# Patient Record
Sex: MALE | Race: WHITE | URBAN - METROPOLITAN AREA
[De-identification: names, ages, dates, MRNs, and addresses within clinical notes are randomized per-mention and may not be internally consistent; named-entity substitution may affect disease eponyms.]

---

## 2017-02-09 ENCOUNTER — OFFICE VISIT (OUTPATIENT)
Dept: FAMILY MEDICINE | Facility: CLINIC | Age: 27
End: 2017-02-09
Payer: COMMERCIAL

## 2017-02-09 VITALS
HEART RATE: 90 BPM | SYSTOLIC BLOOD PRESSURE: 124 MMHG | BODY MASS INDEX: 20.39 KG/M2 | OXYGEN SATURATION: 98 % | DIASTOLIC BLOOD PRESSURE: 76 MMHG | HEIGHT: 65 IN | TEMPERATURE: 97.9 F | WEIGHT: 122.4 LBS

## 2017-02-09 DIAGNOSIS — L02.91 ABSCESS: Primary | ICD-10-CM

## 2017-02-09 LAB
GRAM STN SPEC: NORMAL
MICRO REPORT STATUS: NORMAL
SPECIMEN SOURCE: NORMAL

## 2017-02-09 PROCEDURE — 87186 SC STD MICRODIL/AGAR DIL: CPT | Performed by: NURSE PRACTITIONER

## 2017-02-09 PROCEDURE — 87205 SMEAR GRAM STAIN: CPT | Performed by: NURSE PRACTITIONER

## 2017-02-09 PROCEDURE — 87077 CULTURE AEROBIC IDENTIFY: CPT | Performed by: NURSE PRACTITIONER

## 2017-02-09 PROCEDURE — 87070 CULTURE OTHR SPECIMN AEROBIC: CPT | Performed by: NURSE PRACTITIONER

## 2017-02-09 PROCEDURE — 99213 OFFICE O/P EST LOW 20 MIN: CPT | Performed by: NURSE PRACTITIONER

## 2017-02-09 RX ORDER — CEPHALEXIN 500 MG/1
500 CAPSULE ORAL 3 TIMES DAILY
Qty: 30 CAPSULE | Refills: 0 | Status: SHIPPED | OUTPATIENT
Start: 2017-02-09 | End: 2017-02-19

## 2017-02-09 NOTE — NURSING NOTE
"Chief Complaint   Patient presents with     Mass     Abdominal Pain       Initial /76 mmHg  Pulse 90  Temp(Src) 97.9  F (36.6  C) (Oral)  Ht 5' 4.75\" (1.645 m)  Wt 122 lb 6.4 oz (55.52 kg)  BMI 20.52 kg/m2  SpO2 98% Estimated body mass index is 20.52 kg/(m^2) as calculated from the following:    Height as of this encounter: 5' 4.75\" (1.645 m).    Weight as of this encounter: 122 lb 6.4 oz (55.52 kg).  Medication Reconciliation: complete     Elisa Keller CMA    "

## 2017-02-09 NOTE — PROGRESS NOTES
"  SUBJECTIVE:                                                    Emma Tobias is a 26 year old male who presents to clinic today for the following health issues:    ABDOMINAL PAIN     Onset: 3 days      Description:   Character: Unable to describe, feels pain when pressing on the area or with certain movements. Also feels a small bump in the area. Skin can be itchy at times   Location: josé luis-umbilical region  Radiation: None    Intensity: moderate    Progression of Symptoms:  same    Accompanying Signs & Symptoms:  Fever/Chills?: no   Gas/Bloating: no   Nausea: no   Vomitting: no   Diarrhea?: no   Constipation:no   Dysuria or Hematuria: no    History:   Trauma: no   Previous similar pain: YES- 3-4 months ago- went away after 2 days    Previous tests done: none. 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 Has never had symptoms like this before. Often wears large belt yvonne which may irritate belly button, and has some loose skin on abdomen from previous weight loss surgery.  -------------------------------------    Problem list and histories reviewed & adjusted, as indicated.  Additional history: as documented    Labs reviewed in EPIC  Problem list, Medication list, Allergies, and Medical/Social/Surgical histories reviewed in [facility] and updated as appropriate.    ROS:  Constitutional, HEENT, cardiovascular, pulmonary, gi and gu systems are negative, except as otherwise noted.    OBJECTIVE:                                                    /76 mmHg  Pulse 90  Temp(Src) 97.9  F (36.6  C) (Oral)  Ht 5' 4.75\" (1.645 m)  Wt 122 lb 6.4 oz (55.52 kg)  BMI 20.52 kg/m2  SpO2 98%  Body mass index is 20.52 kg/(m^2).  GENERAL: healthy, alert and no " distress  NECK: no adenopathy, no asymmetry, masses, or scars and thyroid normal to palpation  RESP: lungs clear to auscultation - no rales, rhonchi or wheezes  CV: regular rate and rhythm, normal S1 S2, no S3 or S4, no murmur, click or rub, no peripheral edema and peripheral pulses strong  ABDOMEN: soft, nontender, without hepatosplenomegaly or masses and small amount of pus present in umbilicus; firm mass superior to umbilicus, about 2 cm in diameter and tender; no erythema  MS: no gross musculoskeletal defects noted, no edema    Diagnostic Test Results:  none      ASSESSMENT/PLAN:                                                      Problem List Items Addressed This Visit     None      Visit Diagnoses     Abscess    -  Primary     Relevant Medications     cephALEXin (KEFLEX) 500 MG capsule     Other Relevant Orders     Wound Culture Aerobic Bacterial (Completed)     Gram stain (Completed)        See patient instructions  MATHEW Rawls CNP  [facility] [place]

## 2017-02-09 NOTE — MR AVS SNAPSHOT
"              After Visit Summary   2/9/2017    Emma Tobias    MRN: [id number]           Patient Information     Date Of Birth          1990        Visit Information        Provider Department      2/9/2017 2:00 PM Tasha Lopez APRN CNP [facility] [place]        Today's Diagnoses     Abscess    -  1       Care Instructions    Either apply warm washcloth or soak in bath three times daily to allow wound to drain. It will drain a small amount of pus.  Take antibiotics three times daily for ten days.  Call the clinic if you notice the belly button is more red or swollen, or if you develop streaks of red on your belly, fever or chills.        Follow-ups after your visit        Who to contact     If you have questions or need follow up information about today's clinic visit or your schedule please contact [facility] directly at [phone].  Normal or non-critical lab and imaging results will be communicated to you by MyChart, letter or phone within 4 business days after the clinic has received the results. If you do not hear from us within 7 days, please contact the clinic through MyChart or phone. If you have a critical or abnormal lab result, we will notify you by phone as soon as possible.  Submit refill requests through [employer] or call your pharmacy and they will forward the refill request to us. Please allow 3 business days for your refill to be completed.          Additional Information About Your Visit        MyChart Information     [employer] lets you send messages to your doctor, view your test results, renew your prescriptions, schedule appointments and more. To sign up, go to [email or web address] . Click on \"Log in\" on the left side of the screen, which will take you to the Welcome page. Then click on \"Sign up Now\" on the right side of the page.     You will be asked to enter the access code listed below, as well as some personal information. Please follow the directions " "to create your username and password.     Your access code is: [email or web address]  Expires: 5/10/2017  2:33 PM     Your access code will  in 90 days. If you need help or a new code, please call your [place] clinic or [phone].        Care EveryWhere ID     This is your Care EveryWhere ID. This could be used by other organizations to access your [place] medical records  [id number]        Your Vitals Were     Pulse Temperature Height BMI (Body Mass Index) Pulse Oximetry       90 97.9  F (36.6  C) (Oral) 5' 4.75\" (1.645 m) 20.52 kg/m2 98%        Blood Pressure from Last 3 Encounters:   17 124/76   10/28/15 122/66    Weight from Last 3 Encounters:   17 122 lb 6.4 oz (55.52 kg)   10/28/15 154 lb 5.2 oz (70 kg)              Today, you had the following     No orders found for display         Today's Medication Changes          These changes are accurate as of: 17  2:34 PM.  If you have any questions, ask your nurse or doctor.               Start taking these medicines.        Dose/Directions    cephALEXin 500 MG capsule   Commonly known as:  KEFLEX   Used for:  Abscess   Started by:  Tasha Lopez APRN CNP        Dose:  500 mg   Take 1 capsule (500 mg) by mouth 3 times daily for 10 days   Quantity:  30 capsule   Refills:  0            Where to get your medicines      These medications were sent to [id number] - [place], [place] -  [address] AT [facility] [address]   [address], [place] [address]     Phone:  [phone]    - cephALEXin 500 MG capsule             Primary Care Provider    None Specified       No primary provider on file.        Thank you!     Thank you for choosing [facility]  for your care. Our goal is always to provide you with excellent care. Hearing back from our patients is one way we can continue to improve our services. Please take a few minutes to complete the written survey that you may receive in the mail after your visit with " us. Thank you!             Your Updated Medication List - Protect others around you: Learn how to safely use, store and throw away your medicines at www.disposemymeds.org.          This list is accurate as of: 2/9/17  2:34 PM.  Always use your most recent med list.                   Brand Name Dispense Instructions for use    cephALEXin 500 MG capsule    KEFLEX    30 capsule    Take 1 capsule (500 mg) by mouth 3 times daily for 10 days

## 2017-02-09 NOTE — PATIENT INSTRUCTIONS
Either apply warm washcloth or soak in bath three times daily to allow wound to drain. It will drain a small amount of pus.  Take antibiotics three times daily for ten days.  Call the clinic if you notice the belly button is more red or swollen, or if you develop streaks of red on your belly, fever or chills.

## 2017-02-13 LAB
BACTERIA SPEC CULT: ABNORMAL
MICRO REPORT STATUS: ABNORMAL
MICROORGANISM SPEC CULT: ABNORMAL
SPECIMEN SOURCE: ABNORMAL

## 2017-03-01 ENCOUNTER — TELEPHONE (OUTPATIENT)
Dept: FAMILY MEDICINE | Facility: CLINIC | Age: 27
End: 2017-03-01

## 2017-04-05 NOTE — TELEPHONE ENCOUNTER
Called patient's number on file on 4/5/17, Number is no longer patient's phone number.      Mail containing lab results shredded on 4/5/17.